# Patient Record
Sex: FEMALE | Race: WHITE | NOT HISPANIC OR LATINO | Employment: OTHER | URBAN - NONMETROPOLITAN AREA
[De-identification: names, ages, dates, MRNs, and addresses within clinical notes are randomized per-mention and may not be internally consistent; named-entity substitution may affect disease eponyms.]

---

## 2024-11-05 ENCOUNTER — OFFICE VISIT (OUTPATIENT)
Dept: NEUROLOGY | Facility: CLINIC | Age: 44
End: 2024-11-05
Payer: MEDICARE

## 2024-11-05 VITALS
TEMPERATURE: 98 F | RESPIRATION RATE: 14 BRPM | OXYGEN SATURATION: 97 % | SYSTOLIC BLOOD PRESSURE: 122 MMHG | HEIGHT: 67 IN | HEART RATE: 82 BPM | WEIGHT: 183.6 LBS | BODY MASS INDEX: 28.82 KG/M2 | DIASTOLIC BLOOD PRESSURE: 74 MMHG

## 2024-11-05 DIAGNOSIS — G43.909 ACUTE MIGRAINE: ICD-10-CM

## 2024-11-05 DIAGNOSIS — G43.711 INTRACTABLE CHRONIC MIGRAINE WITHOUT AURA AND WITH STATUS MIGRAINOSUS: Primary | ICD-10-CM

## 2024-11-05 PROCEDURE — 1160F RVW MEDS BY RX/DR IN RCRD: CPT | Performed by: NURSE PRACTITIONER

## 2024-11-05 PROCEDURE — 1159F MED LIST DOCD IN RCRD: CPT | Performed by: NURSE PRACTITIONER

## 2024-11-05 PROCEDURE — 99204 OFFICE O/P NEW MOD 45 MIN: CPT | Performed by: NURSE PRACTITIONER

## 2024-11-05 RX ORDER — OXYCODONE HYDROCHLORIDE 15 MG/1
15 TABLET ORAL EVERY 6 HOURS PRN
COMMUNITY

## 2024-11-05 RX ORDER — RIZATRIPTAN BENZOATE 10 MG/1
10 TABLET ORAL ONCE AS NEEDED
Qty: 9 TABLET | Refills: 3 | Status: SHIPPED | OUTPATIENT
Start: 2024-11-05

## 2024-11-05 RX ORDER — METOPROLOL SUCCINATE 100 MG/1
100 TABLET, EXTENDED RELEASE ORAL DAILY
COMMUNITY

## 2024-11-05 RX ORDER — DEXLANSOPRAZOLE 60 MG/1
60 CAPSULE, DELAYED RELEASE ORAL DAILY
COMMUNITY
Start: 2024-10-29

## 2024-11-05 RX ORDER — GABAPENTIN 600 MG/1
600 TABLET ORAL 2 TIMES DAILY
COMMUNITY
Start: 2024-09-23

## 2024-11-05 RX ORDER — METOPROLOL SUCCINATE 50 MG/1
50 TABLET, EXTENDED RELEASE ORAL DAILY
COMMUNITY

## 2024-11-05 RX ORDER — TRAZODONE HYDROCHLORIDE 100 MG/1
100 TABLET ORAL NIGHTLY
COMMUNITY

## 2024-11-05 RX ORDER — RIZATRIPTAN BENZOATE 10 MG/1
10 TABLET ORAL ONCE AS NEEDED
COMMUNITY
Start: 2024-09-27 | End: 2024-11-05 | Stop reason: SDUPTHER

## 2024-11-05 RX ORDER — DIAZEPAM 5 MG/1
10 TABLET ORAL EVERY 8 HOURS PRN
COMMUNITY

## 2024-11-05 RX ORDER — FREMANEZUMAB-VFRM 225 MG/1.5ML
225 INJECTION SUBCUTANEOUS
Qty: 1.68 ML | Refills: 6 | Status: SHIPPED | OUTPATIENT
Start: 2024-11-05

## 2024-11-05 RX ORDER — DULOXETIN HYDROCHLORIDE 60 MG/1
60 CAPSULE, DELAYED RELEASE ORAL DAILY
COMMUNITY

## 2024-11-05 RX ORDER — TIZANIDINE HYDROCHLORIDE 6 MG/1
6 CAPSULE, GELATIN COATED ORAL 3 TIMES DAILY PRN
COMMUNITY

## 2024-11-05 RX ORDER — ONDANSETRON 4 MG/1
4 TABLET, ORALLY DISINTEGRATING ORAL EVERY 8 HOURS PRN
COMMUNITY

## 2024-11-05 RX ORDER — TAMSULOSIN HYDROCHLORIDE 0.4 MG/1
1 CAPSULE ORAL DAILY
COMMUNITY

## 2024-11-05 RX ORDER — SEMAGLUTIDE 2.68 MG/ML
2 INJECTION, SOLUTION SUBCUTANEOUS WEEKLY
COMMUNITY
Start: 2024-10-29

## 2024-11-05 NOTE — PROGRESS NOTES
New Patient Office Visit      Patient Name: Jeni Lawson  : 1980   MRN: [id number]     Referring Physician: Ingrid Vieira AP*    Chief Complaint:    Chief Complaint   Patient presents with    Establish Care     Migraine; pt started having migraines about a year ago. Pt reports  HA days    Meds tried: tylenol, Excedrin, aleve.        History of Present Illness: Jeni Lawson is a 44 y.o. female who is here today to establish care with Neurology.  She has never been seen before neurology.  She was referred to us from PCP (vanessa) for migraine headache. She did have some imaging and is an established pt of Neurosurgery ([facility]) and has FU .     MDM . She describes her HA as a sharp biting pain that begins to the top of skull or forehead and will radiate to the entire head. Sometimes HA can start in her cervical spine and occipital skull and will radiate to the entire skull. + light and sound sensitivity. + N/V. She likes to sleep these off in a cold dark room. She using a cold HA band and neck pillows. Triggers: unknown. She has been suffering from migraines x 1 year. She is using Maxalt PRN as a rescue medication and this will terminate her migraines. Meds Tried and Failed: Maxalt, gabapentin, duloxetine, metoprolol, Tylenol, Excedrin. Last vision exam ; wears glasses.     SOCIAL: Happily , Julian. Three children (ages 24, 14, 2 years). Retired [employer]. Retired early with disability- cervical neck fusion. Current 1/4 PPD with no interest in cessation. No ETOH. No recreational drugs. No  service.     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    HENT: Negative.     Eyes: Negative.    Respiratory: Negative.     Cardiovascular: Negative.    Gastrointestinal: Negative.    Endocrine: Negative.    Genitourinary: Negative.    Musculoskeletal:  Positive for neck pain.   Skin: Negative.    Allergic/Immunologic: Negative.    Neurological:  Positive for headache.   Hematological: Negative.    Psychiatric/Behavioral: Negative.     All other systems reviewed and are negative.      Past Medical History: No past medical history on file.    Past Surgical History: No past surgical history on file.    Family History: History reviewed. No pertinent family history.    Social History:   Social History     Socioeconomic History    Marital status:    Tobacco Use    Smoking status: Every Day     Current packs/day: 0.25     Types: Cigarettes   Substance and Sexual Activity    Alcohol use: Not Currently    Drug use: Never    Sexual activity: Defer       Medications:     Current Outpatient Medications:     dexlansoprazole (DEXILANT) 60 MG capsule, Take 1 capsule by mouth Daily., Disp: , Rfl:     diazePAM (VALIUM) 5 MG tablet, Take 2 tablets by mouth Every 8 (Eight) Hours As Needed., Disp: , Rfl:     DULoxetine (CYMBALTA) 60 MG capsule, Take 1 capsule by mouth Daily., Disp: , Rfl:     gabapentin (NEURONTIN) 600 MG tablet, Take 1 tablet by mouth 2 (Two) Times a Day., Disp: , Rfl:     metoprolol succinate XL (TOPROL-XL) 100 MG 24 hr tablet, Take 1 tablet by mouth Daily., Disp: , Rfl:     metoprolol succinate XL (TOPROL-XL) 50 MG 24 hr tablet, Take 1 tablet by mouth Daily., Disp: , Rfl:     ondansetron ODT (ZOFRAN-ODT) 4 MG disintegrating tablet, Place 1 tablet on the tongue Every 8 (Eight) Hours As Needed., Disp: , Rfl:     oxyCODONE (ROXICODONE) 15 MG immediate  "release tablet, Take 1 tablet by mouth Every 6 (Six) Hours As Needed., Disp: , Rfl:     Ozempic, 2 MG/DOSE, 8 MG/3ML solution pen-injector, Inject 2 mg under the skin into the appropriate area as directed 1 (One) Time Per Week., Disp: , Rfl:     rizatriptan (MAXALT) 10 MG tablet, Take 1 tablet by mouth 1 (One) Time As Needed for Migraine., Disp: 9 tablet, Rfl: 3    tamsulosin (FLOMAX) 0.4 MG capsule 24 hr capsule, Take 1 capsule by mouth Daily., Disp: , Rfl:     TiZANidine (ZANAFLEX) 6 MG capsule, Take 1 capsule by mouth 3 (Three) Times a Day As Needed., Disp: , Rfl:     traZODone (DESYREL) 100 MG tablet, Take 1 tablet by mouth Every Night., Disp: , Rfl:     Fremanezumab-vfrm (Ajovy) 225 MG/1.5ML solution auto-injector, Inject 225 mg under the skin into the appropriate area as directed Every 30 (Thirty) Days., Disp: 1.68 mL, Rfl: 6    Fremanezumab-vfrm 225 MG/1.5ML solution prefilled syringe, Inject 225 mg under the skin into the appropriate area as directed 1 (One) Time for 1 dose., Disp: 1.68 mL, Rfl: 0    Allergies:   No Known Allergies    Objective     Physical Exam:  Vital Signs:   Vitals:    11/05/24 1432   BP: 122/74   BP Location: Left arm   Patient Position: Sitting   Cuff Size: Adult   Pulse: 82   Resp: 14   Temp: 98 °F (36.7 °C)   TempSrc: Infrared   SpO2: 97%   Weight: 83.3 kg (183 lb 9.6 oz)   Height: 170.2 cm (67\")   PainSc:   6   PainLoc: Head     Body mass index is 28.76 kg/m².     Physical Exam  Vitals and nursing note reviewed.   Constitutional:       General: She is not in acute distress.     Appearance: Normal appearance.   HENT:      Head: Normocephalic.      Nose: Nose normal.      Mouth/Throat:      Mouth: Mucous membranes are moist.      Pharynx: Oropharynx is clear.   Eyes:      Extraocular Movements: Extraocular movements intact.      Conjunctiva/sclera: Conjunctivae normal.   Musculoskeletal:      Cervical back: Normal range of motion and neck supple.   Skin:     General: Skin is warm and " dry.      Capillary Refill: Capillary refill takes less than 2 seconds.   Neurological:      General: No focal deficit present.      Mental Status: She is alert and oriented to person, place, and time.      Cranial Nerves: No cranial nerve deficit.      Sensory: No sensory deficit.      Motor: No weakness.      Coordination: Coordination normal.      Gait: Gait normal.      Deep Tendon Reflexes: Reflexes normal.   Psychiatric:         Mood and Affect: Mood normal.         Behavior: Behavior normal.         Neurological Exam  Mental Status  Alert. Oriented to person, place, and time.    Cranial Nerves  CN III, IV, VI: Extraocular movements intact bilaterally.    Gait   Normal gait.      PHQ-9 Total Score:       Assessment / Plan      Assessment/Plan:   Diagnoses and all orders for this visit:    1. Intractable chronic migraine without aura and with status migrainosus (Primary)  -     Fremanezumab-vfrm (Ajovy) 225 MG/1.5ML solution auto-injector; Inject 225 mg under the skin into the appropriate area as directed Every 30 (Thirty) Days.  Dispense: 1.68 mL; Refill: 6    2. Acute migraine  -     rizatriptan (MAXALT) 10 MG tablet; Take 1 tablet by mouth 1 (One) Time As Needed for Migraine.  Dispense: 9 tablet; Refill: 3         Follow Up:   No follow-ups on file.    Anticipatory guidance and safety reviewed  Patient education provided  Begin Ajovy 225 mg Monthly (PREVENTION); SE reviewed.  First dose given in office.  Samples and coupons provided.  Continue Maxalt 10 mg PRN (ABORTIVE); SE reviewed   Encouraged HA journaling for trigger identification and prevention    RTC PRN or within 12 weeks or sooner with issues     DENNIS Vyas  [facility]

## 2025-01-30 ENCOUNTER — OFFICE VISIT (OUTPATIENT)
Dept: NEUROLOGY | Facility: CLINIC | Age: 45
End: 2025-01-30
Payer: MEDICARE

## 2025-01-30 VITALS
HEIGHT: 67 IN | OXYGEN SATURATION: 97 % | RESPIRATION RATE: 14 BRPM | DIASTOLIC BLOOD PRESSURE: 72 MMHG | SYSTOLIC BLOOD PRESSURE: 114 MMHG | TEMPERATURE: 98.2 F | BODY MASS INDEX: 29.63 KG/M2 | WEIGHT: 188.8 LBS | HEART RATE: 86 BPM

## 2025-01-30 DIAGNOSIS — G43.909 ACUTE MIGRAINE: ICD-10-CM

## 2025-01-30 DIAGNOSIS — G43.711 INTRACTABLE CHRONIC MIGRAINE WITHOUT AURA AND WITH STATUS MIGRAINOSUS: ICD-10-CM

## 2025-01-30 RX ORDER — RIZATRIPTAN BENZOATE 10 MG/1
10 TABLET ORAL ONCE AS NEEDED
Qty: 9 TABLET | Refills: 3 | Status: SHIPPED | OUTPATIENT
Start: 2025-01-30

## 2025-01-30 RX ORDER — FREMANEZUMAB-VFRM 225 MG/1.5ML
225 INJECTION SUBCUTANEOUS
Qty: 1.68 ML | Refills: 6 | Status: SHIPPED | OUTPATIENT
Start: 2025-01-30

## 2025-01-30 NOTE — PROGRESS NOTES
"     Follow Up Office Visit      Patient Name: Jeni Lawson  : 1980   MRN: [id number]     Chief Complaint:    Chief Complaint   Patient presents with    Follow-up     Migraine; pt reports  HA days       History of Present Illness: Jeni Lawson is a 44 y.o. female who is here today to follow up with neurology for migraine HA. She was last seen in clinic  ([facility]).     MDM . She was started on Ajovy 225 mg Monthly and had no migraines the first month following medication start. \"I Felt great!\" She did not realize she had medication at the pharmacy and has not been taking her monthly injection and HA have returned. She has been using Maxalt 10 mg PRN and this will terminate her migraines.     Taken from previous encounter:    She describes her HA as a sharp biting pain that begins to the top of skull or forehead and will radiate to the entire head. Sometimes HA can start in her cervical spine and occipital skull and will radiate to the entire skull. + light and sound sensitivity. + N/V. She likes to sleep these off in a cold dark room. She using a cold HA band and neck pillows. Triggers: unknown.  Meds Tried and Failed: Ajovy, Maxalt, gabapentin, duloxetine, metoprolol, Tylenol, Excedrin. Last vision exam ; wears glasses.     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    HENT: Negative.     Eyes: Negative.    Respiratory: Negative.     Cardiovascular: Negative.    Gastrointestinal: Negative.    Endocrine: " Negative.    Genitourinary: Negative.    Musculoskeletal: Negative.    Skin: Negative.    Allergic/Immunologic: Negative.    Neurological:  Positive for headache.   Hematological: Negative.    Psychiatric/Behavioral: Negative.     All other systems reviewed and are negative.      I have reviewed and the following portions of the patient's history were updated as appropriate: past family history, past medical history, past social history, past surgical history and problem list.    Medications:     Current Outpatient Medications:     dexlansoprazole (DEXILANT) 60 MG capsule, Take 1 capsule by mouth Daily., Disp: , Rfl:     diazePAM (VALIUM) 5 MG tablet, Take 2 tablets by mouth Every 8 (Eight) Hours As Needed., Disp: , Rfl:     DULoxetine (CYMBALTA) 60 MG capsule, Take 1 capsule by mouth Daily., Disp: , Rfl:     Fremanezumab-vfrm (Ajovy) 225 MG/1.5ML solution auto-injector, Inject 225 mg under the skin into the appropriate area as directed Every 30 (Thirty) Days., Disp: 1.68 mL, Rfl: 6    gabapentin (NEURONTIN) 600 MG tablet, Take 1 tablet by mouth 2 (Two) Times a Day., Disp: , Rfl:     metoprolol succinate XL (TOPROL-XL) 100 MG 24 hr tablet, Take 1 tablet by mouth Daily., Disp: , Rfl:     metoprolol succinate XL (TOPROL-XL) 50 MG 24 hr tablet, Take 1 tablet by mouth Daily., Disp: , Rfl:     ondansetron ODT (ZOFRAN-ODT) 4 MG disintegrating tablet, Place 1 tablet on the tongue Every 8 (Eight) Hours As Needed., Disp: , Rfl:     oxyCODONE (ROXICODONE) 15 MG immediate release tablet, Take 1 tablet by mouth Every 6 (Six) Hours As Needed., Disp: , Rfl:     Ozempic, 2 MG/DOSE, 8 MG/3ML solution pen-injector, Inject 2 mg under the skin into the appropriate area as directed 1 (One) Time Per Week., Disp: , Rfl:     rizatriptan (MAXALT) 10 MG tablet, Take 1 tablet by mouth 1 (One) Time As Needed for Migraine., Disp: 9 tablet, Rfl: 3    tamsulosin (FLOMAX) 0.4 MG capsule 24 hr capsule, Take 1 capsule by mouth Daily., Disp: , Rfl:  "    TiZANidine (ZANAFLEX) 6 MG capsule, Take 1 capsule by mouth 3 (Three) Times a Day As Needed., Disp: , Rfl:     traZODone (DESYREL) 100 MG tablet, Take 1 tablet by mouth Every Night., Disp: , Rfl:     Allergies:   No Known Allergies    Objective     Physical Exam:  Vital Signs:   Vitals:    01/30/25 1450   BP: 114/72   BP Location: Right arm   Patient Position: Sitting   Cuff Size: Adult   Pulse: 86   Resp: 14   Temp: 98.2 °F (36.8 °C)   TempSrc: Infrared   SpO2: 97%   Weight: 85.6 kg (188 lb 12.8 oz)   Height: 170.2 cm (67.01\")   PainSc:   4   PainLoc: Neck  Comment: head, shoulders     Body mass index is 29.56 kg/m².    Physical Exam  Vitals and nursing note reviewed.   Constitutional:       General: She is not in acute distress.     Appearance: Normal appearance.   HENT:      Head: Normocephalic.      Nose: Nose normal.      Mouth/Throat:      Mouth: Mucous membranes are moist.      Pharynx: Oropharynx is clear.   Eyes:      General: Lids are normal.      Extraocular Movements: Extraocular movements intact.      Conjunctiva/sclera: Conjunctivae normal.      Pupils: Pupils are equal, round, and reactive to light.   Musculoskeletal:      Cervical back: Normal range of motion and neck supple.   Skin:     General: Skin is warm and dry.      Capillary Refill: Capillary refill takes less than 2 seconds.   Neurological:      General: No focal deficit present.      Mental Status: She is oriented to person, place, and time.   Psychiatric:         Mood and Affect: Mood normal.         Behavior: Behavior normal.         Neurological Exam  Mental Status  Awake, alert and oriented to person, place and time. Oriented to person, place, and time.    Cranial Nerves  CN II: Visual acuity is normal. Visual fields full to confrontation.  CN III, IV, VI: Extraocular movements intact bilaterally. Normal lids and orbits bilaterally. Pupils equal round and reactive to light bilaterally.  CN V: Facial sensation is normal.  CN VII: " Full and symmetric facial movement.  CN IX, X: Palate elevates symmetrically. Normal gag reflex.  CN XI: Shoulder shrug strength is normal.  CN XII: Tongue midline without atrophy or fasciculations.    Gait  Casual gait is normal including stance, stride, and arm swing.       Assessment / Plan      Assessment/Plan:   Diagnoses and all orders for this visit:    1. Intractable chronic migraine without aura and with status migrainosus  -     Fremanezumab-vfrm (Ajovy) 225 MG/1.5ML solution auto-injector; Inject 225 mg under the skin into the appropriate area as directed Every 30 (Thirty) Days.  Dispense: 1.68 mL; Refill: 6    2. Acute migraine  -     rizatriptan (MAXALT) 10 MG tablet; Take 1 tablet by mouth 1 (One) Time As Needed for Migraine.  Dispense: 9 tablet; Refill: 3         Follow Up:   Return in about 3 months (around 4/30/2025), or if symptoms worsen or fail to improve.    Anticipatory guidance and safety reviewed  Patient education provided  Begin/Resume Ajovy 225 mg Monthly (PREVENTION); SE reviewed. Dose given in office with sample.   Continue Maxalt 10 mg PRN (ABORTIVE); SE reviewed   Encouraged HA journaling for trigger identification and prevention     RTC PRN or within 12 weeks or sooner with issues    Chelly Fajardo, DNP, APRN, FNP-C  [facility]

## 2025-08-11 ENCOUNTER — OFFICE VISIT (OUTPATIENT)
Dept: FAMILY MEDICINE CLINIC | Facility: CLINIC | Age: 45
End: 2025-08-11
Payer: MEDICARE

## 2025-08-11 ENCOUNTER — LAB (OUTPATIENT)
Dept: FAMILY MEDICINE CLINIC | Facility: CLINIC | Age: 45
End: 2025-08-11
Payer: MEDICARE

## 2025-08-11 VITALS
OXYGEN SATURATION: 98 % | BODY MASS INDEX: 32.02 KG/M2 | SYSTOLIC BLOOD PRESSURE: 128 MMHG | HEIGHT: 67 IN | HEART RATE: 85 BPM | DIASTOLIC BLOOD PRESSURE: 80 MMHG | TEMPERATURE: 97.8 F | WEIGHT: 204 LBS

## 2025-08-11 DIAGNOSIS — R73.01 IMPAIRED FASTING GLUCOSE: ICD-10-CM

## 2025-08-11 DIAGNOSIS — E66.9 OBESITY (BMI 35.0-39.9 WITHOUT COMORBIDITY): ICD-10-CM

## 2025-08-11 DIAGNOSIS — E66.9 OBESITY (BMI 35.0-39.9 WITHOUT COMORBIDITY): Primary | ICD-10-CM

## 2025-08-11 LAB
ALBUMIN SERPL-MCNC: 4.2 G/DL (ref 3.5–5.2)
ALBUMIN/GLOB SERPL: 1.7 G/DL
ALP SERPL-CCNC: 59 U/L (ref 39–117)
ALT SERPL W P-5'-P-CCNC: 14 U/L (ref 1–33)
ANION GAP SERPL CALCULATED.3IONS-SCNC: 9.1 MMOL/L (ref 5–15)
AST SERPL-CCNC: 15 U/L (ref 1–32)
BASOPHILS # BLD AUTO: 0.08 10*3/MM3 (ref 0–0.2)
BASOPHILS NFR BLD AUTO: 1.1 % (ref 0–1.5)
BILIRUB SERPL-MCNC: 0.5 MG/DL (ref 0–1.2)
BUN SERPL-MCNC: 9 MG/DL (ref 6–20)
BUN/CREAT SERPL: 14.1 (ref 7–25)
CALCIUM SPEC-SCNC: 9.5 MG/DL (ref 8.6–10.5)
CHLORIDE SERPL-SCNC: 105 MMOL/L (ref 98–107)
CO2 SERPL-SCNC: 26.9 MMOL/L (ref 22–29)
CREAT SERPL-MCNC: 0.64 MG/DL (ref 0.57–1)
DEPRECATED RDW RBC AUTO: 41 FL (ref 37–54)
EGFRCR SERPLBLD CKD-EPI 2021: 111.2 ML/MIN/1.73
EOSINOPHIL # BLD AUTO: 0.25 10*3/MM3 (ref 0–0.4)
EOSINOPHIL NFR BLD AUTO: 3.3 % (ref 0.3–6.2)
ERYTHROCYTE [DISTWIDTH] IN BLOOD BY AUTOMATED COUNT: 12.7 % (ref 12.3–15.4)
GLOBULIN UR ELPH-MCNC: 2.5 GM/DL
GLUCOSE SERPL-MCNC: 99 MG/DL (ref 65–99)
HBA1C MFR BLD: 5.6 % (ref 4.8–5.6)
HCT VFR BLD AUTO: 44.2 % (ref 34–46.6)
HGB BLD-MCNC: 14.3 G/DL (ref 12–15.9)
IMM GRANULOCYTES # BLD AUTO: 0.01 10*3/MM3 (ref 0–0.05)
IMM GRANULOCYTES NFR BLD AUTO: 0.1 % (ref 0–0.5)
LYMPHOCYTES # BLD AUTO: 3.02 10*3/MM3 (ref 0.7–3.1)
LYMPHOCYTES NFR BLD AUTO: 40.3 % (ref 19.6–45.3)
MCH RBC QN AUTO: 28.9 PG (ref 26.6–33)
MCHC RBC AUTO-ENTMCNC: 32.4 G/DL (ref 31.5–35.7)
MCV RBC AUTO: 89.5 FL (ref 79–97)
MONOCYTES # BLD AUTO: 0.66 10*3/MM3 (ref 0.1–0.9)
MONOCYTES NFR BLD AUTO: 8.8 % (ref 5–12)
NEUTROPHILS NFR BLD AUTO: 3.47 10*3/MM3 (ref 1.7–7)
NEUTROPHILS NFR BLD AUTO: 46.4 % (ref 42.7–76)
NRBC BLD AUTO-RTO: 0 /100 WBC (ref 0–0.2)
PLATELET # BLD AUTO: 222 10*3/MM3 (ref 140–450)
PMV BLD AUTO: 12 FL (ref 6–12)
POTASSIUM SERPL-SCNC: 4.1 MMOL/L (ref 3.5–5.2)
PROT SERPL-MCNC: 6.7 G/DL (ref 6–8.5)
RBC # BLD AUTO: 4.94 10*6/MM3 (ref 3.77–5.28)
SODIUM SERPL-SCNC: 141 MMOL/L (ref 136–145)
WBC NRBC COR # BLD AUTO: 7.49 10*3/MM3 (ref 3.4–10.8)

## 2025-08-11 PROCEDURE — 80053 COMPREHEN METABOLIC PANEL: CPT | Performed by: FAMILY MEDICINE

## 2025-08-11 PROCEDURE — 83036 HEMOGLOBIN GLYCOSYLATED A1C: CPT | Performed by: FAMILY MEDICINE

## 2025-08-11 PROCEDURE — 85025 COMPLETE CBC W/AUTO DIFF WBC: CPT | Performed by: FAMILY MEDICINE

## 2025-08-11 PROCEDURE — 36415 COLL VENOUS BLD VENIPUNCTURE: CPT

## 2025-08-11 RX ORDER — DULOXETIN HYDROCHLORIDE 30 MG/1
30 CAPSULE, DELAYED RELEASE ORAL DAILY
COMMUNITY

## 2025-08-12 ENCOUNTER — PATIENT ROUNDING (BHMG ONLY) (OUTPATIENT)
Dept: FAMILY MEDICINE CLINIC | Facility: CLINIC | Age: 45
End: 2025-08-12
Payer: MEDICARE

## 2025-08-12 ENCOUNTER — RESULTS FOLLOW-UP (OUTPATIENT)
Dept: FAMILY MEDICINE CLINIC | Facility: CLINIC | Age: 45
End: 2025-08-12
Payer: MEDICARE